# Patient Record
Sex: MALE | Race: WHITE | NOT HISPANIC OR LATINO | Employment: OTHER | URBAN - METROPOLITAN AREA
[De-identification: names, ages, dates, MRNs, and addresses within clinical notes are randomized per-mention and may not be internally consistent; named-entity substitution may affect disease eponyms.]

---

## 2017-07-11 ENCOUNTER — APPOINTMENT (OUTPATIENT)
Dept: PHYSICAL THERAPY | Facility: CLINIC | Age: 72
End: 2017-07-11
Payer: COMMERCIAL

## 2017-07-11 PROCEDURE — 97035 APP MDLTY 1+ULTRASOUND EA 15: CPT

## 2017-07-11 PROCEDURE — 97140 MANUAL THERAPY 1/> REGIONS: CPT

## 2017-07-11 PROCEDURE — 97110 THERAPEUTIC EXERCISES: CPT

## 2017-07-11 PROCEDURE — G8981 BODY POS CURRENT STATUS: HCPCS

## 2017-07-11 PROCEDURE — 97162 PT EVAL MOD COMPLEX 30 MIN: CPT

## 2017-07-11 PROCEDURE — G8982 BODY POS GOAL STATUS: HCPCS

## 2017-07-12 ENCOUNTER — APPOINTMENT (OUTPATIENT)
Dept: PHYSICAL THERAPY | Facility: CLINIC | Age: 72
End: 2017-07-12
Payer: COMMERCIAL

## 2017-07-12 PROCEDURE — 97140 MANUAL THERAPY 1/> REGIONS: CPT

## 2017-07-12 PROCEDURE — 97110 THERAPEUTIC EXERCISES: CPT

## 2017-07-12 PROCEDURE — 97035 APP MDLTY 1+ULTRASOUND EA 15: CPT

## 2017-07-18 ENCOUNTER — APPOINTMENT (OUTPATIENT)
Dept: PHYSICAL THERAPY | Facility: CLINIC | Age: 72
End: 2017-07-18
Payer: COMMERCIAL

## 2017-07-18 PROCEDURE — 97035 APP MDLTY 1+ULTRASOUND EA 15: CPT

## 2017-07-18 PROCEDURE — 97110 THERAPEUTIC EXERCISES: CPT

## 2017-07-18 PROCEDURE — 97140 MANUAL THERAPY 1/> REGIONS: CPT

## 2017-07-21 ENCOUNTER — APPOINTMENT (OUTPATIENT)
Dept: PHYSICAL THERAPY | Facility: CLINIC | Age: 72
End: 2017-07-21
Payer: COMMERCIAL

## 2017-07-21 PROCEDURE — 97110 THERAPEUTIC EXERCISES: CPT

## 2017-07-21 PROCEDURE — 97035 APP MDLTY 1+ULTRASOUND EA 15: CPT

## 2017-07-21 PROCEDURE — 97140 MANUAL THERAPY 1/> REGIONS: CPT

## 2017-07-25 ENCOUNTER — APPOINTMENT (OUTPATIENT)
Dept: PHYSICAL THERAPY | Facility: CLINIC | Age: 72
End: 2017-07-25
Payer: COMMERCIAL

## 2017-07-25 PROCEDURE — 97140 MANUAL THERAPY 1/> REGIONS: CPT

## 2017-07-25 PROCEDURE — 97110 THERAPEUTIC EXERCISES: CPT

## 2017-07-25 PROCEDURE — 97035 APP MDLTY 1+ULTRASOUND EA 15: CPT

## 2017-07-28 ENCOUNTER — APPOINTMENT (OUTPATIENT)
Dept: PHYSICAL THERAPY | Facility: CLINIC | Age: 72
End: 2017-07-28
Payer: COMMERCIAL

## 2017-07-28 PROCEDURE — 97140 MANUAL THERAPY 1/> REGIONS: CPT

## 2017-07-28 PROCEDURE — 97110 THERAPEUTIC EXERCISES: CPT

## 2017-07-28 PROCEDURE — 97035 APP MDLTY 1+ULTRASOUND EA 15: CPT

## 2017-08-01 ENCOUNTER — APPOINTMENT (OUTPATIENT)
Dept: PHYSICAL THERAPY | Facility: CLINIC | Age: 72
End: 2017-08-01
Payer: COMMERCIAL

## 2017-08-01 PROCEDURE — 97110 THERAPEUTIC EXERCISES: CPT

## 2017-08-01 PROCEDURE — 97035 APP MDLTY 1+ULTRASOUND EA 15: CPT

## 2017-08-01 PROCEDURE — 97140 MANUAL THERAPY 1/> REGIONS: CPT

## 2017-08-01 PROCEDURE — G8981 BODY POS CURRENT STATUS: HCPCS

## 2017-08-01 PROCEDURE — G8982 BODY POS GOAL STATUS: HCPCS

## 2017-08-04 ENCOUNTER — APPOINTMENT (OUTPATIENT)
Dept: PHYSICAL THERAPY | Facility: CLINIC | Age: 72
End: 2017-08-04
Payer: COMMERCIAL

## 2017-08-04 PROCEDURE — 97110 THERAPEUTIC EXERCISES: CPT

## 2017-08-04 PROCEDURE — 97140 MANUAL THERAPY 1/> REGIONS: CPT

## 2017-08-04 PROCEDURE — 97035 APP MDLTY 1+ULTRASOUND EA 15: CPT

## 2017-08-08 ENCOUNTER — APPOINTMENT (OUTPATIENT)
Dept: PHYSICAL THERAPY | Facility: CLINIC | Age: 72
End: 2017-08-08
Payer: COMMERCIAL

## 2017-08-08 PROCEDURE — 97110 THERAPEUTIC EXERCISES: CPT

## 2017-08-08 PROCEDURE — 97035 APP MDLTY 1+ULTRASOUND EA 15: CPT

## 2017-08-08 PROCEDURE — 97140 MANUAL THERAPY 1/> REGIONS: CPT

## 2017-08-09 ENCOUNTER — APPOINTMENT (OUTPATIENT)
Dept: PHYSICAL THERAPY | Facility: CLINIC | Age: 72
End: 2017-08-09
Payer: COMMERCIAL

## 2017-08-09 PROCEDURE — 97035 APP MDLTY 1+ULTRASOUND EA 15: CPT

## 2017-08-09 PROCEDURE — 97140 MANUAL THERAPY 1/> REGIONS: CPT

## 2017-08-09 PROCEDURE — 97110 THERAPEUTIC EXERCISES: CPT

## 2017-08-16 ENCOUNTER — APPOINTMENT (OUTPATIENT)
Dept: PHYSICAL THERAPY | Facility: CLINIC | Age: 72
End: 2017-08-16
Payer: COMMERCIAL

## 2017-08-16 PROCEDURE — 97110 THERAPEUTIC EXERCISES: CPT

## 2017-08-16 PROCEDURE — 97035 APP MDLTY 1+ULTRASOUND EA 15: CPT

## 2017-08-16 PROCEDURE — 97140 MANUAL THERAPY 1/> REGIONS: CPT

## 2017-08-18 ENCOUNTER — APPOINTMENT (OUTPATIENT)
Dept: PHYSICAL THERAPY | Facility: CLINIC | Age: 72
End: 2017-08-18
Payer: COMMERCIAL

## 2017-08-18 PROCEDURE — 97110 THERAPEUTIC EXERCISES: CPT

## 2017-08-18 PROCEDURE — 97140 MANUAL THERAPY 1/> REGIONS: CPT

## 2017-08-18 PROCEDURE — 97035 APP MDLTY 1+ULTRASOUND EA 15: CPT

## 2017-08-21 ENCOUNTER — APPOINTMENT (OUTPATIENT)
Dept: PHYSICAL THERAPY | Facility: CLINIC | Age: 72
End: 2017-08-21
Payer: COMMERCIAL

## 2017-08-21 PROCEDURE — 97110 THERAPEUTIC EXERCISES: CPT

## 2017-08-21 PROCEDURE — 97035 APP MDLTY 1+ULTRASOUND EA 15: CPT

## 2017-08-21 PROCEDURE — 97140 MANUAL THERAPY 1/> REGIONS: CPT

## 2018-09-26 ENCOUNTER — EVALUATION (OUTPATIENT)
Dept: PHYSICAL THERAPY | Facility: CLINIC | Age: 73
End: 2018-09-26
Payer: COMMERCIAL

## 2018-09-26 ENCOUNTER — TRANSCRIBE ORDERS (OUTPATIENT)
Dept: PHYSICAL THERAPY | Facility: CLINIC | Age: 73
End: 2018-09-26

## 2018-09-26 DIAGNOSIS — M77.02 MEDIAL EPICONDYLITIS OF LEFT ELBOW: Primary | ICD-10-CM

## 2018-09-26 PROCEDURE — G8984 CARRY CURRENT STATUS: HCPCS | Performed by: PHYSICAL THERAPIST

## 2018-09-26 PROCEDURE — 97140 MANUAL THERAPY 1/> REGIONS: CPT | Performed by: PHYSICAL THERAPIST

## 2018-09-26 PROCEDURE — 97035 APP MDLTY 1+ULTRASOUND EA 15: CPT | Performed by: PHYSICAL THERAPIST

## 2018-09-26 PROCEDURE — G8985 CARRY GOAL STATUS: HCPCS | Performed by: PHYSICAL THERAPIST

## 2018-09-26 PROCEDURE — 97110 THERAPEUTIC EXERCISES: CPT | Performed by: PHYSICAL THERAPIST

## 2018-09-26 PROCEDURE — 97161 PT EVAL LOW COMPLEX 20 MIN: CPT | Performed by: PHYSICAL THERAPIST

## 2018-09-26 NOTE — LETTER
2018    Mainor Singh DO  [address]  [facility] [address]    Patient: Miles Andersen   YOB: 1945   Date of Visit: 2018     Encounter Diagnosis     ICD-10-CM    1  Medial epicondylitis of left elbow M77 02        Dear Dr Ted Irvin:    Please review the attached Plan of Care from [facility] recent visit  Please verify that you agree therapy should continue by signing the attached document and sending it back to our office  If you have any questions or concerns, please don't hesitate to call  Sincerely,    Darnell Roy, PT      Referring Provider:      I certify that I have read the below Plan of Care and certify the need for these services furnished under this plan of treatment while under my care  Mainor Singh DO  [address] [facility] [address]  VIA Facsimile: [phone]          PT Evaluation     Today's date: 2018  Patient name: Miles Andersen  :   MRN: [id number]  Referring provider: Gracia Brink MD  Dx:   Encounter Diagnosis     ICD-10-CM    1  Medial epicondylitis of left elbow M77 02        Start Time: 1400  Stop Time: 1505  Total time in clinic (min): 65 minutes    Assessment    Assessment details: Miles Andersen 67 y o  Male presents a nearly 3 month history of persistent left medial elbow pain  The patient reports no specific trauma or injury to his left elbow or forearm, but some form of overuse or repetitive activity is suspected  Perhaps, yard work or tool use around the house  The patient did receive a cortisone injection 2018 which significantly reduced the patient's symptoms  Left pain is localized at the medial epicondyle rated  5/10 with resistive wrist flexion and supination  AROM L elbow and Forearm Supination  Pronation WNL  AROM left wrist essentially WNL Strength  L elbow F  -4 E 4  Strength R / L  wrist  F  -4/5  E +4/5       Test R / L = 68/ 61 lbs   of force     Goals  STG  Decrease localized medial elbow pain 50% 2 weeks  Increase elbow, wrist flexion and supination by 1/2 grade 2 weeks  LTG  Promote self management during functional use of left UE  Establish effective HEP    Plan  Plan details: 2 X per week 3-4 weeks  Pulsed ultrasound   MT STM   TE - stretching; mild eccentric  Strengthening, include HEP        Subjective    Objective    Flowsheet Rows      Most Recent Value   PT/OT G-Codes   Assessment Type  Evaluation   G code set  Carrying, Moving & Handling Objects   Carrying, Moving and Handling Objects Current Status ()  CJ   Carrying, Moving and Handling Objects Goal Status ()  [address]            Manual  9/26            STM 15            [employer]                              Exercise Diary  9/26            Bent elbow flexion stretch 3X  20'            Straight elbow flexion stretch 3 X 20'                                                       Modalities  9/26            Pulsed US 15            CP

## 2018-09-26 NOTE — LETTER
2018    Inge Reynolds, [id number] [facility] [address]    Patient: Arturo Antonio   YOB: 1945   Date of Visit: 2018     Encounter Diagnosis     ICD-10-CM    1  Medial epicondylitis of left elbow M77 02        Dear Dr Ji Davis:    Please review the attached Plan of Care from [facility] recent visit  Please verify that you agree therapy should continue by signing the attached document and sending it back to our office  If you have any questions or concerns, please don't hesitate to call  Sincerely,    Oj Iqbal, PT      Referring Provider:      I certify that I have read the below Plan of Care and certify the need for these services furnished under this plan of treatment while under my care  Inge Reynolds MD  [address] [address]  VIA Facsimile: [phone]          PT Evaluation     Today's date: 2018  Patient name: Arturo Antonio  : 51/3/1910  MRN: [id number]  Referring provider: Donavan Thorne MD  Dx:   Encounter Diagnosis     ICD-10-CM    1  Medial epicondylitis of left elbow M77 02        Start Time: 1400  Stop Time: 1505  Total time in clinic (min): 65 minutes    Assessment    Assessment details: Arturo Antonio 67 y o  Male presents a nearly 3 month history of persistent left medial elbow pain  The patient reports no specific trauma or injury to his left elbow or forearm, but some form of overuse or repetitive activity is suspected  Perhaps, yard work or tool use around the house  The patient did receive a cortisone injection 2018 which significantly reduced the patient's symptoms  Left pain is localized at the medial epicondyle rated  5/10 with resistive wrist flexion and supination  AROM L elbow and Forearm Supination  Pronation WNL  AROM left wrist essentially WNL Strength  L elbow F  -4 E 4  Strength R / L  wrist  F  -4/5  E +4/5       Test R / L = 68/ 61 lbs   of force     Goals  STG  Decrease localized medial elbow pain 50% 2 weeks  Increase elbow, wrist flexion and supination by 1/2 grade 2 weeks  LTG  Promote self management during functional use of left UE  Establish effective HEP    Plan  Plan details: 2 X per week 3-4 weeks  Pulsed ultrasound   MT STM   TE - stretching; mild eccentric  Strengthening, include HEP        Subjective    Objective    Flowsheet Rows      Most Recent Value   PT/OT G-Codes   Assessment Type  Evaluation   G code set  Carrying, Moving & Handling Objects   Carrying, Moving and Handling Objects Current Status ()  CJ   Carrying, Moving and Handling Objects Goal Status ()  [facility]            Manual  9/26            STM 15            mktg                              Exercise Diary  9/26            Bent elbow flexion stretch 3X  20'            Straight elbow flexion stretch 3 X 20'                                                       Modalities  9/26            Pulsed US 15            CP

## 2018-09-26 NOTE — PROGRESS NOTES
PT Evaluation     Today's date: 2018  Patient name: Bulmaro Al  :   MRN: [id number]  Referring provider: Jeremías Clifton MD  Dx:   Encounter Diagnosis     ICD-10-CM    1  Medial epicondylitis of left elbow M77 02        Start Time: 1400  Stop Time: 1505  Total time in clinic (min): 65 minutes    Assessment    Assessment details: Bulmaro Al 67 y o  Male presents a nearly 3 month history of persistent left medial elbow pain  The patient reports no specific trauma or injury to his left elbow or forearm, but some form of overuse or repetitive activity is suspected  Perhaps, yard work or tool use around the house  The patient did receive a cortisone injection 2018 which significantly reduced the patient's symptoms  Left pain is localized at the medial epicondyle rated  5/10 with resistive wrist flexion and supination  AROM L elbow and Forearm Supination  Pronation WNL  AROM left wrist essentially WNL Strength  L elbow F  -4 E 4  Strength R / L  wrist  F  -4/5  E +4/5       Test R / L = 68/ 61 lbs  of force      Goals  STG  Decrease localized medial elbow pain 50% 2 weeks  Increase elbow, wrist flexion and supination by 1/2 grade 2 weeks  LTG  Promote self management during functional use of left UE  Establish effective HEP    Plan  Plan details: 2 X per week 3-4 weeks  Pulsed ultrasound   MT STM   TE - stretching; mild eccentric  Strengthening, include HEP        Subjective    Objective    Flowsheet Rows      Most Recent Value   PT/OT G-Codes   Assessment Type  Evaluation   G code set  Carrying, Moving & Handling Objects   Carrying, Moving and Handling Objects Current Status ()  CJ   Carrying, Moving and Handling Objects Goal Status ()  [address] [address]            Manual              [facility] 15            [employer]                              Exercise Diary              Bent elbow flexion stretch 3X  20'            Straight elbow flexion stretch 3 X 20' Modalities  9/26            Pulsed US 15            CP

## 2018-09-28 ENCOUNTER — OFFICE VISIT (OUTPATIENT)
Dept: PHYSICAL THERAPY | Facility: CLINIC | Age: 73
End: 2018-09-28
Payer: COMMERCIAL

## 2018-09-28 DIAGNOSIS — M77.02 MEDIAL EPICONDYLITIS OF LEFT ELBOW: Primary | ICD-10-CM

## 2018-09-28 PROCEDURE — 97035 APP MDLTY 1+ULTRASOUND EA 15: CPT | Performed by: PHYSICAL THERAPIST

## 2018-09-28 PROCEDURE — 97110 THERAPEUTIC EXERCISES: CPT | Performed by: PHYSICAL THERAPIST

## 2018-09-28 PROCEDURE — 97140 MANUAL THERAPY 1/> REGIONS: CPT | Performed by: PHYSICAL THERAPIST

## 2018-09-28 NOTE — PROGRESS NOTES
Daily Note     Today's date: 2018  Patient name: Mike Newsome  :   MRN: [id number]  Referring provider: Timothy Bellamy DO  Dx:   Encounter Diagnosis     ICD-10-CM    1  Medial epicondylitis of left elbow M77 02        Start Time: 1025  Stop Time: 1115  Total time in clinic (min): 50 minutes    Subjective: Patient has been using elbow support for functional or repetitive activities, as recommended  He continues to perform his stretching exercises      Objective:  PT repeated pulsed ultrasound and STM  Added eccentric wrist flexion and supination to exercises  Manual                     STM 15  15                   [employer]                                                     Exercise Diary                     Bent elbow flexion stretch 3X  20'  3X 20'                   Straight elbow flexion stretch 3 X 20'  3X 20'                                            DB eccentric wrist flexion    3# 2/10                    DB eccentric suppination    3# 2/10                         Modalities                     Pulsed US 15  15                   CP    8                            Assessment: Tolerated treatment well  Patient instructed to include eccentric exercises in his HEP      Plan: Continue present POC

## 2018-10-01 ENCOUNTER — OFFICE VISIT (OUTPATIENT)
Dept: PHYSICAL THERAPY | Facility: CLINIC | Age: 73
End: 2018-10-01
Payer: COMMERCIAL

## 2018-10-01 DIAGNOSIS — M77.02 MEDIAL EPICONDYLITIS OF LEFT ELBOW: Primary | ICD-10-CM

## 2018-10-01 PROCEDURE — 97110 THERAPEUTIC EXERCISES: CPT

## 2018-10-01 PROCEDURE — 97035 APP MDLTY 1+ULTRASOUND EA 15: CPT

## 2018-10-01 PROCEDURE — 97140 MANUAL THERAPY 1/> REGIONS: CPT

## 2018-10-01 NOTE — PROGRESS NOTES
Daily Note     Today's date: 10/1/2018  Patient name: Katy Wooten  :   MRN: [id number]  Referring provider: Prema Schultz DO  Dx:   Encounter Diagnosis     ICD-10-CM    1  Medial epicondylitis of left elbow M77 02        Start Time: 1000  Stop Time: 1050  Total time in clinic (min): 50 minutes    Subjective: Pt notes slight improvement since starting PT  Objective: See treatment diary below  Manual  9/26  9/28  10/1                 STM 15  15  15                 [employer]                                                     Exercise Diary  9/26  9/28  10/1                 Bent elbow flexion stretch 3X  20'  3X 20'  3x 20"                 Straight elbow flexion stretch 3 X 20'  3X 20'  3x 20"                                          DB eccentric wrist flexion    3# 2/10  3#  2/10                  DB eccentric suppination    3# 2/10  3#  2/10                       Modalities  9/26  9/28  10/1                 Pulsed US 15  15  15                 CP    8  12                        Assessment: Tolerated treatment well  Patient exhibited good technique with therapeutic exercises      Plan: Continue per plan of care

## 2018-10-03 ENCOUNTER — OFFICE VISIT (OUTPATIENT)
Dept: PHYSICAL THERAPY | Facility: CLINIC | Age: 73
End: 2018-10-03
Payer: COMMERCIAL

## 2018-10-03 DIAGNOSIS — M77.02 MEDIAL EPICONDYLITIS OF LEFT ELBOW: Primary | ICD-10-CM

## 2018-10-03 PROCEDURE — 97035 APP MDLTY 1+ULTRASOUND EA 15: CPT | Performed by: PHYSICAL THERAPIST

## 2018-10-03 PROCEDURE — 97110 THERAPEUTIC EXERCISES: CPT | Performed by: PHYSICAL THERAPIST

## 2018-10-03 PROCEDURE — 97140 MANUAL THERAPY 1/> REGIONS: CPT | Performed by: PHYSICAL THERAPIST

## 2018-10-03 NOTE — PROGRESS NOTES
Daily Note     Today's date: 10/3/2018  Patient name: Jonah Navarro  : [birth year]  MRN: [id number]  Referring provider: Melissa Jimenez DO  Dx:   Encounter Diagnosis     ICD-10-CM    1  Medial epicondylitis of left elbow M77 02        Start Time: 1015  Stop Time: 1105  Total time in clinic (min): 50 minutes    Subjective: Patient has been compliant with HEP and use of elbow support during exercises and functional activity      Objective: PT repeated modalities and STM  Manual  9/26  9/28  10/1  10/3               STM 15  15  15  15               [employer]                                                     Exercise Diary  9/26  9/28  10/1  10/3               Bent elbow flexion stretch 3X  20'  3X 20'  3x 20"  3X20'               Straight elbow flexion stretch 3 X 20'  3X 20'  3x 20"  3X20'                                        DB eccentric wrist flexion    3# 2/10  3#  2/10  3# 3/10                DB eccentric suppination    3# 2/10  3#  2/10  3# 3/10                     Modalities  9/26  9/28  10/1  10/3               Pulsed US 15  15  15  15               CP    8  12                          Assessment: Tolerated treatment well  Plan: Continue per plan of care

## 2018-10-08 ENCOUNTER — OFFICE VISIT (OUTPATIENT)
Dept: PHYSICAL THERAPY | Facility: CLINIC | Age: 73
End: 2018-10-08
Payer: COMMERCIAL

## 2018-10-08 DIAGNOSIS — M77.02 MEDIAL EPICONDYLITIS OF LEFT ELBOW: Primary | ICD-10-CM

## 2018-10-08 PROCEDURE — 97035 APP MDLTY 1+ULTRASOUND EA 15: CPT

## 2018-10-08 PROCEDURE — 97010 HOT OR COLD PACKS THERAPY: CPT

## 2018-10-08 PROCEDURE — 97110 THERAPEUTIC EXERCISES: CPT

## 2018-10-08 PROCEDURE — 97140 MANUAL THERAPY 1/> REGIONS: CPT

## 2018-10-08 NOTE — PROGRESS NOTES
Daily Note     Today's date: 10/8/2018  Patient name: Brtet Servin  : [birth year]  MRN: [id number]  Referring provider: Padmaja Duron DO  Dx:   Encounter Diagnosis     ICD-10-CM    1  Medial epicondylitis of left elbow M77 02        Start Time: 1000  Stop Time: 1102  Total time in clinic (min): 62 minutes    Subjective: Patient stated overall improvement with decreased pain in L elbow, less difficulty with daily tasks such as washing hands  Objective: See treatment diary below  Assessment: Tolerated treatment well  Patient exhibited good technique with therapeutic exercises , soreness noted post treat  Plan: Continue per plan of care        Manual  9/26  9/28  10/1  10/3  10/8             STM 15  15  15  15  15             [employer]                                                     Exercise Diary  9/26  9/28  10/1  10/3  10/8             Bent elbow flexion stretch 3X  20'  3X 20'  3x 20"  3X20'  3x20"             Straight elbow flexion stretch 3 X 20'  3X 20'  3x 20"  3X20'  3x20"                                      DB eccentric wrist flexion    3# 2/10  3#  2/10  3# 3/10 3# 3/10              DB eccentric suppination    3# 2/10  3#  2/10  3# 3/10  3# 3/10                   Modalities  9/26  9/28  10/1  10/3  10/8             Pulsed US 15  15  15  15  15             CP    8  12    12

## 2018-10-10 ENCOUNTER — OFFICE VISIT (OUTPATIENT)
Dept: PHYSICAL THERAPY | Facility: CLINIC | Age: 73
End: 2018-10-10
Payer: COMMERCIAL

## 2018-10-10 DIAGNOSIS — M77.02 MEDIAL EPICONDYLITIS OF LEFT ELBOW: Primary | ICD-10-CM

## 2018-10-10 PROCEDURE — 97110 THERAPEUTIC EXERCISES: CPT | Performed by: PHYSICAL THERAPIST

## 2018-10-10 PROCEDURE — 97140 MANUAL THERAPY 1/> REGIONS: CPT | Performed by: PHYSICAL THERAPIST

## 2018-10-10 PROCEDURE — 97035 APP MDLTY 1+ULTRASOUND EA 15: CPT | Performed by: PHYSICAL THERAPIST

## 2018-10-10 PROCEDURE — 97010 HOT OR COLD PACKS THERAPY: CPT | Performed by: PHYSICAL THERAPIST

## 2018-10-10 NOTE — PROGRESS NOTES
Daily Note     Today's date: 10/10/2018  Patient name: Selena Iqbal  :   MRN: [id number]  Referring provider: Allison Son DO  Dx:   Encounter Diagnosis     ICD-10-CM    1  Medial epicondylitis of left elbow M77 02        Start Time: 1010  Stop Time: 1106  Total time in clinic (min): 56 minutes    Subjective: Patient notes some improvement in functional use of left UE without elbow pain  He continues to use elbow support during functional activity       Objective: PT administered pulsed ultrasound followed by STM  Patient completed exercises without experiencing medial elbow pain during or post     Manual  9/26  9/28  10/1  10/3  10/8  10/10           STM 15  15  15  15  15  12           [employer]                                                     Exercise Diary  9/26  9/28  10/1  10/3  10/8  10/10           Bent elbow flexion stretch 3X  20'  3X 20'  3x 20"  3X20'  3x20"  3X 21'           Straight elbow flexion stretch 3 X 20'  3X 20'  3x 20"  3X20'  3x20"  3x 20'                                    DB eccentric wrist flexion    3# 2/10  3#  2/10  3# 3/10 3# 3/10  3# 3/10            DB eccentric suppination    3# 2/10  3#  2/10  3# 3/10  3# 3/10  3# 3/10                 Modalities  9/26  9/28  10/1  10/3  10/8  10/10           Pulsed US 15  15  15  15  15  15           CP    8  12    12  12                          Assessment: Tolerated treatment well  Plan: Continue per plan of care    Increase resistance in exercises next visit

## 2018-10-15 ENCOUNTER — OFFICE VISIT (OUTPATIENT)
Dept: PHYSICAL THERAPY | Facility: CLINIC | Age: 73
End: 2018-10-15
Payer: COMMERCIAL

## 2018-10-15 DIAGNOSIS — M77.02 MEDIAL EPICONDYLITIS OF LEFT ELBOW: Primary | ICD-10-CM

## 2018-10-15 PROCEDURE — 97110 THERAPEUTIC EXERCISES: CPT

## 2018-10-15 PROCEDURE — 97140 MANUAL THERAPY 1/> REGIONS: CPT

## 2018-10-15 PROCEDURE — 97010 HOT OR COLD PACKS THERAPY: CPT

## 2018-10-15 PROCEDURE — 97035 APP MDLTY 1+ULTRASOUND EA 15: CPT

## 2018-10-17 ENCOUNTER — OFFICE VISIT (OUTPATIENT)
Dept: PHYSICAL THERAPY | Facility: CLINIC | Age: 73
End: 2018-10-17
Payer: COMMERCIAL

## 2018-10-17 ENCOUNTER — TRANSCRIBE ORDERS (OUTPATIENT)
Dept: PHYSICAL THERAPY | Facility: CLINIC | Age: 73
End: 2018-10-17

## 2018-10-17 DIAGNOSIS — M77.02 MEDIAL EPICONDYLITIS OF LEFT ELBOW: Primary | ICD-10-CM

## 2018-10-17 PROCEDURE — 97140 MANUAL THERAPY 1/> REGIONS: CPT | Performed by: PHYSICAL THERAPIST

## 2018-10-17 PROCEDURE — G8984 CARRY CURRENT STATUS: HCPCS | Performed by: PHYSICAL THERAPIST

## 2018-10-17 PROCEDURE — 97110 THERAPEUTIC EXERCISES: CPT | Performed by: PHYSICAL THERAPIST

## 2018-10-17 PROCEDURE — G8985 CARRY GOAL STATUS: HCPCS | Performed by: PHYSICAL THERAPIST

## 2018-10-17 PROCEDURE — 97035 APP MDLTY 1+ULTRASOUND EA 15: CPT | Performed by: PHYSICAL THERAPIST

## 2018-10-17 NOTE — PROGRESS NOTES
PT Re-Evaluation     Today's date: 10/17/2018  Patient name: Mukesh Urena  : [birth year]  MRN: [id number]  Referring provider: Wayne Steven DO  Dx:   Encounter Diagnosis     ICD-10-CM    1  Medial epicondylitis of left elbow M77 02        Start Time: 1005  Stop Time: 1102  Total time in clinic (min): 57 minutes    Assessment    Assessment details: Mukesh Urena 67 y o  male with  a  3 month history of persistent left medial elbow pain has been seen 8 times in outpatient physical therapy  The patient reports slight improvement, but continues to experience left medial elbow with repetitive activity that requires supination  Perhaps, yard work or tool use around the house  Left pain is localized at the medial epicondyle with resistive wrist flexion and supination  AROM L elbow and Forearm Supination  Pronation WNL  AROM left wrist essentially WNL Strength  L elbow F  4 E 4  Strength  L  wrist  F  4/5  E 5/5       Test R / L = 68/ 61 lbs  of force    Goals  STG  Decrease localized medial elbow pain 50% 2 weeks - Partial  Increase elbow, wrist flexion and supination by 1/2 grade 2 weeks - Achieved  LTG  Promote self management during functional use of left UE  Establish effective HEP     Plan   2 X per week 3 weeks  Pulsed ultrasound   MT STM trial of kinesio taping tachniques        Subjective  Patient reports slight improvement, but still has localized pain with repetitive motions     Objective   AROM left wrist essentially WNL Strength  L elbow F  4 E 4  Strength  L  wrist  F  4/5  E 5/5       Test R / L = 68/ 61 lbs   of force      Flowsheet Rows      Most Recent Value   PT/OT G-Codes   Assessment Type  Re-evaluation   G code set  Carrying, Moving & Handling Objects   Carrying, Moving and Handling Objects Current Status ()  CJ   Carrying, Moving and Handling Objects Goal Status ()  [facility]

## 2018-10-17 NOTE — LETTER
2018    Sindy Ivory DO  [address]  [facility] [address]    Patient: Drew Cordero   YOB: 1945   Date of Visit: 10/17/2018     Encounter Diagnosis     ICD-10-CM    1  Medial epicondylitis of left elbow M77 02        Dear Dr Erma Llamas:    Please review the attached Plan of Care from [facility] recent visit  Please verify that you agree therapy should continue by signing the attached document and sending it back to our office  If you have any questions or concerns, please don't hesitate to call  Sincerely,    Baldomero Cornelius, PT      Referring Provider:      I certify that I have read the below Plan of Care and certify the need for these services furnished under this plan of treatment while under my care  Sindy Ivory DO  [address] [address]  VIA Facsimile: [phone]          PT Re-Evaluation     Today's date: 10/17/2018  Patient name: Drew Cordero  : [birth year]  MRN: [id number]  Referring provider: Nic Vences DO  Dx:   Encounter Diagnosis     ICD-10-CM    1  Medial epicondylitis of left elbow M77 02        Start Time: 1005  Stop Time: 110  Total time in clinic (min): 57 minutes    Assessment    Assessment details: Drew Cordero 67 y o  male with  a  3 month history of persistent left medial elbow pain has been seen 8 times in outpatient physical therapy  The patient reports slight improvement, but continues to experience left medial elbow with repetitive activity that requires supination  Perhaps, yard work or tool use around the house  Left pain is localized at the medial epicondyle with resistive wrist flexion and supination  AROM L elbow and Forearm Supination  Pronation WNL  AROM left wrist essentially WNL Strength  L elbow F  4 E 4  Strength  L  wrist  F  4/5  E 5/5       Test R / L = 68/ 61 lbs   of force    Goals  STG  Decrease localized medial elbow pain 50% 2 weeks - Partial  Increase elbow, wrist flexion and supination by 1/2 grade 2 weeks - Achieved  LTG  Promote self management during functional use of left UE  Establish effective HEP     Plan   2 X per week 3 weeks  Pulsed ultrasound   MT STM trial of kinesio taping tachniques        Subjective  Patient reports slight improvement, but still has localized pain with repetitive motions     Objective   AROM left wrist essentially WNL Strength  L elbow F  4 E 4  Strength  L  wrist  F  4/5  E 5/5       Test R / L = 68/ 61 lbs   of force      Flowsheet Rows      Most Recent Value   PT/OT G-Codes   Assessment Type  Re-evaluation   G code set  Carrying, Moving & Handling Objects   Carrying, Moving and Handling Objects Current Status ()  CJ   Carrying, Moving and Handling Objects Goal Status ()  [address]

## 2018-10-22 ENCOUNTER — OFFICE VISIT (OUTPATIENT)
Dept: PHYSICAL THERAPY | Facility: CLINIC | Age: 73
End: 2018-10-22
Payer: COMMERCIAL

## 2018-10-22 DIAGNOSIS — M77.02 MEDIAL EPICONDYLITIS OF LEFT ELBOW: Primary | ICD-10-CM

## 2018-10-22 PROCEDURE — 97140 MANUAL THERAPY 1/> REGIONS: CPT

## 2018-10-22 PROCEDURE — 97110 THERAPEUTIC EXERCISES: CPT

## 2018-10-22 PROCEDURE — 97035 APP MDLTY 1+ULTRASOUND EA 15: CPT

## 2018-10-22 NOTE — PROGRESS NOTES
Daily Note     Today's date: 10/22/2018  Patient name: Eulalia Delgado  :   MRN: [id number]  Referring provider: Christine Suarez DO  Dx:   Encounter Diagnosis     ICD-10-CM    1  Medial epicondylitis of left elbow M77 02        Start Time: 1000  Stop Time: 1102  Total time in clinic (min): 62 minutes    Subjective: Pt notes some improvement with use of [employer] last visit  Objective: See treatment diary below  Progressed to 4# due to ease of performance  Manual  9/26  9/28  10/1  10/3  10/8  10/10  10/15  10/22       STM 15  15  15  15  15  12  12  10       [employer]                        taping                5             Exercise Diary  9/26  9/28  10/1  10/3  10/8  10/10 10/15  10/22       Bent elbow flexion stretch 3X  20'  3X 20'  3x 20"  3X20'  3x20"  3X 20'  3x 20"  3x  20"       Straight elbow flexion stretch 3 X 20'  3X 20'  3x 20"  3X20'  3x20"  3x 20'  3x 20"  3x  20"                                DB eccentric wrist flexion    3# 2/10  3#  2/10  3# 3/10 3# 3/10  3# 3/10 3#  3/10  4#  3/10        DB eccentric suppination    3# 2/10  3#  2/10  3# 3/10  3# 3/10  3# 3/10  3#  3/10  4#  3/10             Modalities  9/26  9/28  10/1  10/3  10/8  10/10  10/15  10/22       Pulsed US 15  15  15  15  15  15  15  15       CP    8  12    12  12  12  12                 Assessment: Tolerated treatment well  Patient exhibited good technique with therapeutic exercises including progression  Plan: Continue per plan of care

## 2018-10-24 ENCOUNTER — OFFICE VISIT (OUTPATIENT)
Dept: PHYSICAL THERAPY | Facility: CLINIC | Age: 73
End: 2018-10-24
Payer: COMMERCIAL

## 2018-10-24 DIAGNOSIS — M77.02 MEDIAL EPICONDYLITIS OF LEFT ELBOW: Primary | ICD-10-CM

## 2018-10-24 PROCEDURE — 97035 APP MDLTY 1+ULTRASOUND EA 15: CPT | Performed by: PHYSICAL THERAPIST

## 2018-10-24 PROCEDURE — 97140 MANUAL THERAPY 1/> REGIONS: CPT | Performed by: PHYSICAL THERAPIST

## 2018-10-24 PROCEDURE — 97110 THERAPEUTIC EXERCISES: CPT | Performed by: PHYSICAL THERAPIST

## 2018-10-24 NOTE — PROGRESS NOTES
Daily Note     Today's date: 10/24/2018  Patient name: Lashon Howe  :   MRN: [id number]  Referring provider: Amaury Rivas DO  Dx:   Encounter Diagnosis     ICD-10-CM    1  Medial epicondylitis of left elbow M77 02        Start Time: 1030  Stop Time: 1120  Total time in clinic (min): 50 minutes    Subjective: Patient reported that kinesio taping has helped reduce his medial elbow pain during functional activity      Objective: PT administered US to medial elbow followed by STM  Repeated kinesio taping  Manual  9/26  9/28  10/1  10/3  10/8  10/10  10/15  10/22 10/24     STM 15  15  15  15  15  12  12  10  19     [employer]                        taping                5  5           Exercise Diary  9/26  9/28  10/1  10/3  10/8  10/10 10/15  10/22  10/24     Bent elbow flexion stretch 3X  20'  3X 20'  3x 20"  3X20'  3x20"  3X 20'  3x 20"  3x  20"  3X 20'     Straight elbow flexion stretch 3 X 20'  3X 20'  3x 20"  3X20'  3x20"  3x 20'  3x 20"  3x  20"  3X 20'                              DB eccentric wrist flexion    3# 2/10  3#  2/10  3# 3/10 3# 3/10  3# 3/10 3#  3/10  4#  3/10  4# 3/10      DB eccentric suppination    3# 2/10  3#  2/10  3# 3/10  3# 3/10  3# 3/10  3#  3/10  4#  3/10  4# 3/10           Modalities  9/26  9/28  10/1  10/3  10/8  10/10  10/15  10/22  10/24     Pulsed US 15  15  15  15  15  15  15  15  15     CP    8  12    12  12  12  12              Assessment: Tolerated treatment well      Plan: Continue 2 -3 visits

## 2018-10-29 ENCOUNTER — OFFICE VISIT (OUTPATIENT)
Dept: PHYSICAL THERAPY | Facility: CLINIC | Age: 73
End: 2018-10-29
Payer: COMMERCIAL

## 2018-10-29 DIAGNOSIS — M77.02 MEDIAL EPICONDYLITIS OF LEFT ELBOW: Primary | ICD-10-CM

## 2018-10-29 PROCEDURE — 97140 MANUAL THERAPY 1/> REGIONS: CPT

## 2018-10-29 PROCEDURE — 97110 THERAPEUTIC EXERCISES: CPT

## 2018-10-29 PROCEDURE — 97035 APP MDLTY 1+ULTRASOUND EA 15: CPT

## 2018-10-29 NOTE — PROGRESS NOTES
Daily Note     Today's date: 10/29/2018  Patient name: Francoise Frank  : [birth year]  MRN: [id number]  Referring provider: Shea Small DO  Dx:   Encounter Diagnosis     ICD-10-CM    1  Medial epicondylitis of left elbow M77 02        Start Time: 1000  Stop Time: 1057  Total time in clinic (min): 57 minutes    Subjective:Pt comments on mild skin irritation at the taping site  Objective: See treatment diary below  Deferred taping due to skin irritation      Manual  9/26  9/28  10/1  10/3  10/8  10/10  10/15  10/22 10/24  10/29   STM 15  15  15  15  15  12  12  10  19  15   [employer]                        taping                5  5           Exercise Diary  9/26  9/28  10/1  10/3  10/8  10/10 10/15  10/22  10/24 10/29    Bent elbow flexion stretch 3X  20'  3X 20'  3x 20"  3X20'  3x20"  3X 20'  3x 20"  3x  20"  3X 20'  3x  20"   Straight elbow flexion stretch 3 X 20'  3X 20'  3x 20"  3X20'  3x20"  3x 20'  3x 20"  3x  20"  3X 20'  3x   20'                            DB eccentric wrist flexion    3# 2/10  3#  2/10  3# 3/10 3# 3/10  3# 3/10 3#  3/10  4#  3/10  4# 3/10  4# 3/10    DB eccentric suppination    3# 2/10  3#  2/10  3# 3/10  3# 3/10  3# 3/10  3#  3/10  4#  3/10  4# 3/10  4#  3/10         Modalities  9/26  9/28  10/1  10/3  10/8  10/10  10/15  10/22  10/24 10/29    Pulsed US 15  15  15  15  15  15  15  15  15  15   CP    8  12    12  12  12  12    12          Assessment: Tolerated treatment well  Patient exhibited good technique with therapeutic exercises      Plan: Continue per plan of care

## 2018-10-31 ENCOUNTER — APPOINTMENT (OUTPATIENT)
Dept: PHYSICAL THERAPY | Facility: CLINIC | Age: 73
End: 2018-10-31
Payer: COMMERCIAL

## 2018-11-01 ENCOUNTER — OFFICE VISIT (OUTPATIENT)
Dept: PHYSICAL THERAPY | Facility: CLINIC | Age: 73
End: 2018-11-01
Payer: COMMERCIAL

## 2018-11-01 DIAGNOSIS — M77.02 MEDIAL EPICONDYLITIS OF LEFT ELBOW: Primary | ICD-10-CM

## 2018-11-01 PROCEDURE — 97110 THERAPEUTIC EXERCISES: CPT

## 2018-11-01 PROCEDURE — 97035 APP MDLTY 1+ULTRASOUND EA 15: CPT

## 2018-11-01 PROCEDURE — 97140 MANUAL THERAPY 1/> REGIONS: CPT

## 2018-11-01 NOTE — PROGRESS NOTES
Daily Note     Today's date: 2018  Patient name: Delaney Jackson  :   MRN: [id number]  Referring provider: Lillie Calvo DO  Dx:   Encounter Diagnosis     ICD-10-CM    1  Medial epicondylitis of left elbow M77 02        Start Time: 1015  Stop Time: 1112  Total time in clinic (min): 57 minutes    Subjective:Pt comments on increased home ADL's without increased medial elbow pain  Objective: See treatment diary below  Pt declined taping today  Manual  11/1  9/28  10/1  10/3  10/8  10/10  10/15  10/22 10/24  10/29   STM 10  15  15  15  15  12  12  10  19  15   [employer]                        taping                5  5           Exercise Diary  11/1  9/28  10/1  10/3  10/8  10/10 10/15  10/22  10/24 10/29    Bent elbow flexion stretch 3X  20'  3X 20'  3x 20"  3X20'  3x20"  3X 20'  3x 20"  3x  20"  3X 20'  3x  20"   Straight elbow flexion stretch 3 X 20'  3X 20'  3x 20"  3X20'  3x20"  3x 20'  3x 20"  3x  20"  3X 20'  3x   20'                            DB eccentric wrist flexion  3#  2/10  3# 2/10  3#  2/10  3# 3/10 3# 3/10  3# 3/10 3#  3/10  4#  3/10  4# 3/10  4# 3/10    DB eccentric suppination  3#  2/10  3# 2/10  3#  2/10  3# 3/10  3# 3/10  3# 3/10  3#  3/10  4#  3/10  4# 3/10  4#  3/10         Modalities  11/1  9/28  10/1  10/3  10/8  10/10  10/15  10/22  10/24 10/29    Pulsed US 15  15  15  15  15  15  15  15  15  15   CP  12  8  12    12  12  12  12    12          Assessment: Tolerated treatment well  Patient exhibited good technique with therapeutic exercises      Plan: Continue per plan of care

## 2018-11-02 ENCOUNTER — APPOINTMENT (OUTPATIENT)
Dept: PHYSICAL THERAPY | Facility: CLINIC | Age: 73
End: 2018-11-02
Payer: COMMERCIAL

## 2018-11-07 ENCOUNTER — OFFICE VISIT (OUTPATIENT)
Dept: PHYSICAL THERAPY | Facility: CLINIC | Age: 73
End: 2018-11-07
Payer: COMMERCIAL

## 2018-11-07 ENCOUNTER — TRANSCRIBE ORDERS (OUTPATIENT)
Dept: PHYSICAL THERAPY | Facility: CLINIC | Age: 73
End: 2018-11-07

## 2018-11-07 DIAGNOSIS — M77.02 MEDIAL EPICONDYLITIS OF LEFT ELBOW: Primary | ICD-10-CM

## 2018-11-07 PROCEDURE — G8985 CARRY GOAL STATUS: HCPCS | Performed by: PHYSICAL THERAPIST

## 2018-11-07 PROCEDURE — 97035 APP MDLTY 1+ULTRASOUND EA 15: CPT | Performed by: PHYSICAL THERAPIST

## 2018-11-07 PROCEDURE — 97110 THERAPEUTIC EXERCISES: CPT | Performed by: PHYSICAL THERAPIST

## 2018-11-07 PROCEDURE — G8986 CARRY D/C STATUS: HCPCS | Performed by: PHYSICAL THERAPIST

## 2018-11-07 PROCEDURE — 97140 MANUAL THERAPY 1/> REGIONS: CPT | Performed by: PHYSICAL THERAPIST

## 2018-11-07 NOTE — PROGRESS NOTES
PT Discharge    Today's date: 2018  Patient name: Lali Regan  :   MRN: [id number]  Referring provider: Adilson Clark DO  Dx:   Encounter Diagnosis     ICD-10-CM    1  Medial epicondylitis of left elbow M77 02        Start Time: 1000  Stop Time: 1045  Total time in clinic (min): 45 minutes    Assessment    Assessment details: Lali Regan 67 y o  male with  a  3 month history of persistent left medial elbow pain has completed outpatient physical therapy  The patient reports  improvement, and has been compliant with his HEP  He reports less left medial elbow with repetitive activity that requires supination  He continues to use tennis elbow support when performing heavier or repetitive activities, e g  yard work or tool use around the house  AROM L elbow and Forearm Supination  Pronation WNL  AROM left wrist essentially WNL Strength  L elbow F  5 E 5  Strength  L  wrist  F  4/5  E 5/5       Test R / L = 77/ 70 lbs  of force    Goals  STG  Decrease localized medial elbow pain 50% 2 weeks - Partial  Increase elbow, wrist flexion and supination by 1/2 grade 2 weeks - Achieved  LTG  Promote self management during functional use of left UE  Establish effective HEP     Plan   Discharge to HEP         Subjective Patient demonstrates overall improve, but will continue his home management and exercises    Objective     AROM L elbow and Forearm Supination  Pronation WNL  AROM left wrist essentially WNL Strength  L elbow F  5 E 5  Strength  L  wrist  F  4/5  E 5/5       Test R / L = 77/ 70 lbs   of force    Flowsheet Rows      Most Recent Value   PT/OT G-Codes   Assessment Type  Discharge   G code set  Carrying, Moving & Handling Objects   Carrying, Moving and Handling Objects Goal Status ()  CI   Carrying, Moving and Handling Objects Discharge Status ()  CI

## 2018-11-07 NOTE — LETTER
2018    Danish DO Angelita  [address] [facility] [address]    Patient: Rosita Skelton   YOB: 1945   Date of Visit: 2018     Encounter Diagnosis     ICD-10-CM    1  Medial epicondylitis of left elbow M77 02        Dear Dr Germania Sahu:    Please review the attached Plan of Care from [facility] recent visit  Please verify that you agree therapy should continue by signing the attached document and sending it back to our office  If you have any questions or concerns, please don't hesitate to call  Sincerely,    Brittany Lucio, PT      Referring Provider:      I certify that I have read the below Plan of Care and certify the need for these services furnished under this plan of treatment while under my care  Danish Goldstein DO  [address] [address]  VIA Facsimile: [phone]          PT Discharge    Today's date: 2018  Patient name: Rosita Skelton  :   MRN: [id number]  Referring provider: Brian Crystal DO  Dx:   Encounter Diagnosis     ICD-10-CM    1  Medial epicondylitis of left elbow M77 02        Start Time: 1000  Stop Time: 1045  Total time in clinic (min): 45 minutes    Assessment    Assessment details: Rosita Skelton 67 y o  male with  a  3 month history of persistent left medial elbow pain has completed outpatient physical therapy  The patient reports  improvement, and has been compliant with his HEP  He reports less left medial elbow with repetitive activity that requires supination  He continues to use tennis elbow support when performing heavier or repetitive activities, e g  yard work or tool use around the house  AROM L elbow and Forearm Supination  Pronation WNL  AROM left wrist essentially WNL Strength  L elbow F  5 E 5  Strength  L  wrist  F  4/5  E 5/5       Test R / L = 77/ 70 lbs   of force    Goals  STG  Decrease localized medial elbow pain 50% 2 weeks - Partial  Increase elbow, wrist flexion and supination by 1/2 grade 2 weeks - Achieved  LTG  Promote self management during functional use of left UE  Establish effective HEP     Plan   Discharge to HEP         Subjective Patient demonstrates overall improve, but will continue his home management and exercises    Objective     AROM L elbow and Forearm Supination  Pronation WNL  AROM left wrist essentially WNL Strength  L elbow F  5 E 5  Strength  L  wrist  F  4/5  E 5/5       Test R / L = 77/ 70 lbs   of force    Flowsheet Rows      Most Recent Value   PT/OT G-Codes   Assessment Type  Discharge   G code set  Carrying, Moving & Handling Objects   Carrying, Moving and Handling Objects Goal Status ()  CI   Carrying, Moving and Handling Objects Discharge Status ()  CI
